# Patient Record
Sex: FEMALE | Race: WHITE | NOT HISPANIC OR LATINO | URBAN - METROPOLITAN AREA
[De-identification: names, ages, dates, MRNs, and addresses within clinical notes are randomized per-mention and may not be internally consistent; named-entity substitution may affect disease eponyms.]

---

## 2017-06-14 ENCOUNTER — APPOINTMENT (RX ONLY)
Dept: URBAN - METROPOLITAN AREA CLINIC 169 | Facility: CLINIC | Age: 22
Setting detail: DERMATOLOGY
End: 2017-06-14

## 2017-06-14 DIAGNOSIS — Z79.899 OTHER LONG TERM (CURRENT) DRUG THERAPY: ICD-10-CM

## 2017-06-14 DIAGNOSIS — Q826 OTHER SPECIFIED ANOMALIES OF SKIN: ICD-10-CM

## 2017-06-14 DIAGNOSIS — Q828 OTHER SPECIFIED ANOMALIES OF SKIN: ICD-10-CM

## 2017-06-14 DIAGNOSIS — L70.8 OTHER ACNE: ICD-10-CM

## 2017-06-14 DIAGNOSIS — Q819 OTHER SPECIFIED ANOMALIES OF SKIN: ICD-10-CM

## 2017-06-14 PROBLEM — Q82.8 OTHER SPECIFIED CONGENITAL MALFORMATIONS OF SKIN: Status: ACTIVE | Noted: 2017-06-14

## 2017-06-14 PROCEDURE — ? ORDER TESTS

## 2017-06-14 PROCEDURE — ? HIGH RISK MEDICATION MONITORING

## 2017-06-14 PROCEDURE — ? TREATMENT REGIMEN

## 2017-06-14 PROCEDURE — ? PRESCRIPTION

## 2017-06-14 PROCEDURE — 99213 OFFICE O/P EST LOW 20 MIN: CPT

## 2017-06-14 PROCEDURE — ? COUNSELING

## 2017-06-14 RX ORDER — NORGESTIMATE AND ETHINYL ESTRADIOL 7DAYSX3 LO
1 KIT ORAL QD
Qty: 30 | Refills: 0 | Status: ERX | COMMUNITY
Start: 2017-06-14

## 2017-06-14 RX ADMIN — NORGESTIMATE AND ETHINYL ESTRADIOL 1: KIT at 00:00

## 2017-06-14 ASSESSMENT — LOCATION SIMPLE DESCRIPTION DERM
LOCATION SIMPLE: LEFT CHEEK
LOCATION SIMPLE: RIGHT POSTERIOR UPPER ARM
LOCATION SIMPLE: LEFT POSTERIOR UPPER ARM

## 2017-06-14 ASSESSMENT — LOCATION DETAILED DESCRIPTION DERM
LOCATION DETAILED: LEFT DISTAL POSTERIOR UPPER ARM
LOCATION DETAILED: RIGHT DISTAL POSTERIOR UPPER ARM
LOCATION DETAILED: LEFT CENTRAL BUCCAL CHEEK

## 2017-06-14 ASSESSMENT — LOCATION ZONE DERM
LOCATION ZONE: ARM
LOCATION ZONE: FACE

## 2017-06-14 NOTE — PROCEDURE: TREATMENT REGIMEN
Initiate Treatment: Ortho TriCyclen LO qd (patient denies any h/o migraines, does not smoke, no personal of FH of blood clots or breast cancer
Plan: Spironolactone 50mg qd upon review of labs.  Patient then will repeat BMP in one week
Detail Level: Zone
Otc Regimen: CeraVe SA

## 2017-08-24 ENCOUNTER — APPOINTMENT (RX ONLY)
Dept: URBAN - METROPOLITAN AREA CLINIC 169 | Facility: CLINIC | Age: 22
Setting detail: DERMATOLOGY
End: 2017-08-24

## 2017-08-24 DIAGNOSIS — L70.8 OTHER ACNE: ICD-10-CM | Status: RESOLVING

## 2017-08-24 DIAGNOSIS — L90.5 SCAR CONDITIONS AND FIBROSIS OF SKIN: ICD-10-CM

## 2017-08-24 PROCEDURE — 99213 OFFICE O/P EST LOW 20 MIN: CPT

## 2017-08-24 PROCEDURE — ? PRESCRIPTION

## 2017-08-24 PROCEDURE — ? TREATMENT REGIMEN

## 2017-08-24 PROCEDURE — ? COUNSELING

## 2017-08-24 RX ORDER — NORGESTIMATE AND ETHINYL ESTRADIOL 7DAYSX3 LO
1 KIT ORAL QD
Qty: 30 | Refills: 5 | Status: ERX

## 2017-08-24 ASSESSMENT — LOCATION ZONE DERM: LOCATION ZONE: FACE

## 2017-08-24 ASSESSMENT — LOCATION DETAILED DESCRIPTION DERM
LOCATION DETAILED: RIGHT INFERIOR MEDIAL MALAR CHEEK
LOCATION DETAILED: LEFT CENTRAL BUCCAL CHEEK
LOCATION DETAILED: LEFT INFERIOR CENTRAL MALAR CHEEK

## 2017-08-24 ASSESSMENT — LOCATION SIMPLE DESCRIPTION DERM
LOCATION SIMPLE: LEFT CHEEK
LOCATION SIMPLE: RIGHT CHEEK

## 2017-08-24 NOTE — PROCEDURE: TREATMENT REGIMEN
Detail Level: Zone
Plan: Patient is tolerating oral contraceptive pill well with no side effects, at this time she would like to continue only with the pill. No longer interested in spironolactone or topicals at this time\\n\\n
Continue Regimen: Ortho TriCyclen LO qd (tolerating well)\\nBP today - 102/61

## 2018-01-10 ENCOUNTER — APPOINTMENT (RX ONLY)
Dept: URBAN - METROPOLITAN AREA CLINIC 169 | Facility: CLINIC | Age: 23
Setting detail: DERMATOLOGY
End: 2018-01-10

## 2018-01-10 DIAGNOSIS — L70.8 OTHER ACNE: ICD-10-CM | Status: INADEQUATELY CONTROLLED

## 2018-01-10 PROCEDURE — ? COUNSELING

## 2018-01-10 PROCEDURE — 99213 OFFICE O/P EST LOW 20 MIN: CPT

## 2018-01-10 PROCEDURE — ? PRESCRIPTION

## 2018-01-10 PROCEDURE — ? PATIENT SPECIFIC COUNSELING

## 2018-01-10 RX ORDER — DROSPIRENONE AND ETHINYL ESTRADIOL 0.02-3(28)
1 KIT ORAL QD
Qty: 1 | Refills: 11 | Status: ERX | COMMUNITY
Start: 2018-01-10

## 2018-01-10 RX ADMIN — DROSPIRENONE AND ETHINYL ESTRADIOL 1: KIT at 22:58

## 2018-01-10 ASSESSMENT — LOCATION DETAILED DESCRIPTION DERM: LOCATION DETAILED: LEFT CENTRAL BUCCAL CHEEK

## 2018-01-10 ASSESSMENT — LOCATION SIMPLE DESCRIPTION DERM: LOCATION SIMPLE: LEFT CHEEK

## 2018-01-10 ASSESSMENT — LOCATION ZONE DERM: LOCATION ZONE: FACE

## 2018-01-10 NOTE — PROCEDURE: PATIENT SPECIFIC COUNSELING
Detail Level: Simple
acne not adequately controlled with Ortho Tri-Cyclen Lo.  I discussed treatment options for her hormonal acne including change of her birth control pill to either Liz or Deb or adding spironolactone 50 mg daily.  Patient opts for the former option.  We'll start patient on Liz one pill a day after she finishes her current supply of her Ortho Tri-Cyclen Lo.  Return to clinic in 2-3 months if condition does not improve for addition of spironolactone.

## 2023-02-13 ENCOUNTER — APPOINTMENT (RX ONLY)
Dept: URBAN - METROPOLITAN AREA CLINIC 42 | Facility: CLINIC | Age: 28
Setting detail: DERMATOLOGY
End: 2023-02-13

## 2023-02-13 DIAGNOSIS — L738 OTHER SPECIFIED DISEASES OF HAIR AND HAIR FOLLICLES: ICD-10-CM

## 2023-02-13 DIAGNOSIS — L663 OTHER SPECIFIED DISEASES OF HAIR AND HAIR FOLLICLES: ICD-10-CM

## 2023-02-13 DIAGNOSIS — L73.9 FOLLICULAR DISORDER, UNSPECIFIED: ICD-10-CM

## 2023-02-13 PROBLEM — L02.92 FURUNCLE, UNSPECIFIED: Status: ACTIVE | Noted: 2023-02-13

## 2023-02-13 PROCEDURE — 99203 OFFICE O/P NEW LOW 30 MIN: CPT

## 2023-02-13 PROCEDURE — ? COUNSELING

## 2023-02-13 PROCEDURE — ? PRESCRIPTION

## 2023-02-13 RX ORDER — CIPROFLOXACIN 500 MG/1
TABLET, FILM COATED ORAL BID
Qty: 20 | Refills: 0 | Status: ERX | COMMUNITY
Start: 2023-02-13

## 2023-02-13 RX ADMIN — CIPROFLOXACIN: 500 TABLET, FILM COATED ORAL at 00:00
